# Patient Record
Sex: FEMALE | Employment: FULL TIME | URBAN - METROPOLITAN AREA
[De-identification: names, ages, dates, MRNs, and addresses within clinical notes are randomized per-mention and may not be internally consistent; named-entity substitution may affect disease eponyms.]

---

## 2024-11-24 ENCOUNTER — OFFICE VISIT (OUTPATIENT)
Age: 34
End: 2024-11-24

## 2024-11-24 VITALS
WEIGHT: 164 LBS | OXYGEN SATURATION: 95 % | BODY MASS INDEX: 24.86 KG/M2 | RESPIRATION RATE: 16 BRPM | HEIGHT: 68 IN | TEMPERATURE: 100.3 F | SYSTOLIC BLOOD PRESSURE: 103 MMHG | HEART RATE: 93 BPM | DIASTOLIC BLOOD PRESSURE: 65 MMHG

## 2024-11-24 DIAGNOSIS — Z20.818 EXPOSURE TO STREP THROAT: ICD-10-CM

## 2024-11-24 DIAGNOSIS — J45.21 MILD INTERMITTENT REACTIVE AIRWAY DISEASE WITH ACUTE EXACERBATION: Primary | ICD-10-CM

## 2024-11-24 DIAGNOSIS — R06.02 SOB (SHORTNESS OF BREATH): ICD-10-CM

## 2024-11-24 DIAGNOSIS — R03.0 ELEVATED BLOOD PRESSURE READING: ICD-10-CM

## 2024-11-24 DIAGNOSIS — R91.8 LEFT LOWER LOBE PULMONARY INFILTRATE: ICD-10-CM

## 2024-11-24 LAB — S PYO AG THROAT QL: NORMAL

## 2024-11-24 RX ORDER — AZITHROMYCIN 250 MG/1
TABLET, FILM COATED ORAL
Qty: 6 TABLET | Refills: 0 | Status: SHIPPED | OUTPATIENT
Start: 2024-11-24 | End: 2024-12-04

## 2024-11-24 RX ORDER — ALBUTEROL SULFATE 90 UG/1
2 INHALANT RESPIRATORY (INHALATION) 4 TIMES DAILY PRN
Qty: 18 G | Refills: 0 | Status: SHIPPED | OUTPATIENT
Start: 2024-11-24

## 2025-01-27 ENCOUNTER — OFFICE VISIT (OUTPATIENT)
Age: 35
End: 2025-01-27

## 2025-01-27 VITALS
WEIGHT: 159 LBS | RESPIRATION RATE: 16 BRPM | BODY MASS INDEX: 24.18 KG/M2 | TEMPERATURE: 98.9 F | HEART RATE: 85 BPM | DIASTOLIC BLOOD PRESSURE: 65 MMHG | OXYGEN SATURATION: 96 % | SYSTOLIC BLOOD PRESSURE: 99 MMHG

## 2025-01-27 DIAGNOSIS — R09.89 CHEST CONGESTION: Primary | ICD-10-CM

## 2025-01-27 DIAGNOSIS — R05.1 ACUTE COUGH: ICD-10-CM

## 2025-01-27 DIAGNOSIS — J06.9 UPPER RESPIRATORY TRACT INFECTION, UNSPECIFIED TYPE: ICD-10-CM

## 2025-01-27 LAB
INFLUENZA A ANTIGEN, POC: NEGATIVE
INFLUENZA B ANTIGEN, POC: NEGATIVE
Lab: NORMAL
PERFORMING INSTRUMENT: NORMAL
QC PASS/FAIL: NORMAL
S PYO AG THROAT QL: NORMAL
SARS-COV-2, POC: NORMAL

## 2025-01-27 RX ORDER — BROMPHENIRAMINE MALEATE, PSEUDOEPHEDRINE HYDROCHLORIDE, AND DEXTROMETHORPHAN HYDROBROMIDE 2; 30; 10 MG/5ML; MG/5ML; MG/5ML
5 SYRUP ORAL 4 TIMES DAILY PRN
Qty: 118 ML | Refills: 0 | Status: SHIPPED | OUTPATIENT
Start: 2025-01-27

## 2025-01-27 RX ORDER — AMOXICILLIN AND CLAVULANATE POTASSIUM 500; 125 MG/1; MG/1
1 TABLET, FILM COATED ORAL 3 TIMES DAILY
Qty: 30 TABLET | Refills: 0 | Status: SHIPPED | OUTPATIENT
Start: 2025-01-27 | End: 2025-02-06

## 2025-01-27 ASSESSMENT — ENCOUNTER SYMPTOMS
COUGH: 1
VOMITING: 0
NAUSEA: 0
SORE THROAT: 1

## 2025-01-27 NOTE — PROGRESS NOTES
Mildred Zhang (:  1990) is a 34 y.o. female,Established patient, here for evaluation of the following chief complaint(s):  Fever (Fever, chills, SOB, cough, headache, fatigue, nausea, x4 days)      Assessment & Plan :  Visit Diagnoses and Associated Orders       Chest congestion    -  Primary    POCT COVID-19, Antigen [IQQ273 Custom]      POCT Influenza A/B Antigen [42101 Custom]      POCT rapid strep A [65628 Custom]           Acute cough        POCT COVID-19, Antigen [XOY593 Custom]      POCT Influenza A/B Antigen [62581 Custom]      POCT rapid strep A [78094 Custom]           Upper respiratory tract infection, unspecified type             ORDERS WITHOUT AN ASSOCIATED DIAGNOSIS    amoxicillin-clavulanate (AUGMENTIN) 500-125 MG per tablet [48131]      brompheniramine-pseudoephedrine-DM 2-30-10 MG/5ML syrup [70864]               Follow up in 4-5 days if symptoms persist or if symptoms worsen.       Subjective :     34 y.o. female presents with symptoms of cold symptoms, fever, chills, nausea  3 days ago.  Patient states that she feels awful.  She states that she has loss of taste and decreased appetite.  Patient states that she has tried over the counter medications, and goes on to state that she will be traveling within 1 week.  Patient denies other associated GI/  symptoms.  Denies SOB. Denies Chest pain.         Vitals:    25 0957   BP: 99/65   Site: Left Upper Arm   Position: Sitting   Cuff Size: Medium Adult   Pulse: 85   Resp: 16   Temp: 98.9 °F (37.2 °C)   SpO2: 96%   Weight: 72.1 kg (159 lb)       Results for orders placed or performed in visit on 25   POCT COVID-19, Antigen   Result Value Ref Range    SARS-COV-2, POC Not-Detected Not Detected    Lot Number [id number]     QC Pass/Fail pass     Performing Instrument BinaxNOW    POCT Influenza A/B Antigen   Result Value Ref Range    Inflenza A Ag negative     Influenza B Ag negative    POCT rapid strep A   Result Value Ref Range    Strep A